# Patient Record
Sex: MALE | Race: WHITE | NOT HISPANIC OR LATINO | Employment: FULL TIME | URBAN - METROPOLITAN AREA
[De-identification: names, ages, dates, MRNs, and addresses within clinical notes are randomized per-mention and may not be internally consistent; named-entity substitution may affect disease eponyms.]

---

## 2021-06-30 ENCOUNTER — OFFICE VISIT (OUTPATIENT)
Dept: URGENT CARE | Facility: PHYSICIAN GROUP | Age: 51
End: 2021-06-30
Payer: COMMERCIAL

## 2021-06-30 VITALS
WEIGHT: 178.8 LBS | HEIGHT: 69 IN | RESPIRATION RATE: 12 BRPM | TEMPERATURE: 98.6 F | DIASTOLIC BLOOD PRESSURE: 86 MMHG | BODY MASS INDEX: 26.48 KG/M2 | HEART RATE: 99 BPM | OXYGEN SATURATION: 95 % | SYSTOLIC BLOOD PRESSURE: 118 MMHG

## 2021-06-30 DIAGNOSIS — S31.109A OPEN WOUND OF ANTERIOR ABDOMINAL WALL, INITIAL ENCOUNTER: ICD-10-CM

## 2021-06-30 DIAGNOSIS — Z48.89 ENCOUNTER FOR POST SURGICAL WOUND CHECK: ICD-10-CM

## 2021-06-30 DIAGNOSIS — Z48.02 REMOVAL OF STAPLES: ICD-10-CM

## 2021-06-30 PROCEDURE — 99202 OFFICE O/P NEW SF 15 MIN: CPT | Performed by: PHYSICIAN ASSISTANT

## 2021-06-30 ASSESSMENT — ENCOUNTER SYMPTOMS
FEVER: 0
CHILLS: 0
ABDOMINAL PAIN: 1

## 2021-06-30 NOTE — PROGRESS NOTES
"Subjective:      Lino Junior is a 50 y.o. male who presents with Suture / Staple Removal (8 Staples need removed and check area where drain tube was, appy removal 10days ago in [place] )            Patient is a 50-year-old male who presents to urgent care with his wife who also provides history.  Patient is status post 10 days appendectomy unfortunately with perforation underwent removal and drain placement.  Drain was removed-patient has residual 8 staples intact and was encouraged to have wound recheck in 10 days for possible removal.  Reports profound improvement of discomfort, notes minimal drainage from drain site as they have been keeping the area clean and covered.  Denies significant pain, swelling, redness.    Suture / Staple Removal  The sutures were placed 7 to 10 days ago. He tried oral antibiotics since the wound repair. The redness has improved. The swelling has improved. The pain has improved.   Patient already completed full course of antibiotics.  They are currently on vacation visiting from [place] where patient underwent surgery.    Review of Systems   Constitutional: Negative for chills and fever.   Gastrointestinal: Positive for abdominal pain.        Improved abd. Wounds.    Skin: Negative for rash.   All other systems reviewed and are negative.         Objective:     /86 (BP Location: Left arm, Patient Position: Sitting, BP Cuff Size: Adult)   Pulse 99   Temp 37 °C (98.6 °F) (Temporal)   Resp 12   Ht 1.753 m (5' 9\")   Wt 81.1 kg (178 lb 12.8 oz)   SpO2 95%   BMI 26.40 kg/m²    PMH:  has no past medical history on file.  MEDS: Reviewed .   ALLERGIES: No Known Allergies  SURGHX: No past surgical history on file.  SOCHX:  reports that he has been smoking cigarettes. He has never used smokeless tobacco. He reports current alcohol use. He reports that he does not use drugs.  FH: Family history was reviewed, no pertinent findings to report    Physical Exam  Vitals reviewed.  "   Constitutional:       General: He is not in acute distress.     Appearance: He is well-developed.   HENT:      Head: Normocephalic and atraumatic.   Eyes:      Conjunctiva/sclera: Conjunctivae normal.      Pupils: Pupils are equal, round, and reactive to light.   Neck:      Trachea: No tracheal deviation.   Cardiovascular:      Rate and Rhythm: Normal rate.   Pulmonary:      Effort: Pulmonary effort is normal.   Abdominal:          Comments: 3 well-healing surgical sites-midepigastrium, left upper quadrant and left lower quadrant for total of 8 staples these were removed without difficulty without evidence of surrounding erythema, infection or dehiscence.  Small tiny open wound to suprapubic region with minimal serosanguineous fluid.  Nontender abdomen.  Soft.   Musculoskeletal:      Cervical back: Normal range of motion and neck supple.   Skin:     General: Skin is warm.      Findings: No rash.   Neurological:      Mental Status: He is alert and oriented to person, place, and time.      Coordination: Coordination normal.   Psychiatric:         Behavior: Behavior normal.         Thought Content: Thought content normal.         Judgment: Judgment normal.                        Assessment/Plan:        1. Encounter for post surgical wound check  2. Removal of staples  3. Open wound of anterior abdominal wall, initial encounter    Staples were removed today without difficulty.  Patient is to continue to keep the drain site covered until drainage resolves.  No evidence of secondary infection.    RTC PRN.     Appropriate PPE worn at all times by provider.   Pt. Had face mask on throughout entirety of the visit other than oropharyngeal examination today.     DDX, Supportive care, and indications for immediate follow-up discussed with patient.    Instructed to return to clinic or nearest emergency department if we are not available for any change in condition, further concerns, or worsening of symptoms.    The patient  and/or guardian demonstrated a good understanding and agreed with the treatment plan.    Please note that this dictation was created using voice recognition software. I have made every reasonable attempt to correct obvious errors, but I expect that there are errors of grammar and possibly content that I did not discover before finalizing the note.